# Patient Record
Sex: MALE | URBAN - METROPOLITAN AREA
[De-identification: names, ages, dates, MRNs, and addresses within clinical notes are randomized per-mention and may not be internally consistent; named-entity substitution may affect disease eponyms.]

---

## 2018-12-10 ENCOUNTER — IMPORTED ENCOUNTER (OUTPATIENT)
Dept: URBAN - METROPOLITAN AREA CLINIC 9 | Facility: CLINIC | Age: 68
End: 2018-12-10

## 2019-05-01 NOTE — PATIENT DISCUSSION
Patient Instructions  1. No med changes  2. 6 min walk test and full set of PFTs next visit     You are scheduled for a bronchoscopy on 7/12 at 8 AM at the [facility] on 1st floor. Arrive 1 hour early.     Instructions     1. Nothing to eat or drink 8 hours before procedure.  2. Hold your morning medications. Bring them with you to take after the procedure.  3. Have a  available to take you home.     Next transplant clinic appointment:  2 months with CXR, labs and PFTs and bronchoscopy   Next lab draw: 1 month      We will plan to schedule your bronchoscopy on Tuesday 9/12 or Wednesday 9/13 at 8:00am. We were unable to get this on the schedule today, but I will call in early August to arrange. Once I have this scheduled I will contact you to confirm the date and time. If you'd like to follow up with me I can be reached at [phone].    Pt has upcoming social engagements this weekend, so would like to defer procedure today. Will excise on f/u.

## 2019-07-17 NOTE — PROCEDURE NOTE: CLINICAL
PROCEDURE NOTE: Excision of Eyelid Lesion Right Lower Lid. Diagnosis: Eyelid Lesion, Uncertain Behavior. Prior to treatment, the risks/benefits/alternatives were discussed. The patient wished to proceed with procedure. Local anesthetic was given. The lesions were incised and removed. Patient tolerated procedure well. There were no complications. Post procedure instructions given. Adi Khoury

## 2019-07-17 NOTE — PATIENT DISCUSSION
Appear as benign hydrocystomas.   Discussed with patient that benign lesions may recur.  May have bruising and swelling for a week or more.  Patient desires to proceed today. Lesion excised; contents discarded. Consent form signed.  Syd evans TId until healed in.  written rx given.  Follow up prn.

## 2021-03-17 ENCOUNTER — IMPORTED ENCOUNTER (OUTPATIENT)
Dept: URBAN - METROPOLITAN AREA CLINIC 9 | Facility: CLINIC | Age: 71
End: 2021-03-17

## 2021-10-16 ASSESSMENT — VISUAL ACUITY
OD_CC: 20/20 SN
OS_PH: 20/20 SN
OS_SC: 20/25 SN
OD_CC: 20/20 SN
OS_CC: 20/20 - SN
OD_SC: 20/200 SN
OD_SC: 20/100 SN
OS_CC: 20/25 SN
OS_SC: 20/30 -2 SN

## 2021-10-16 ASSESSMENT — KERATOMETRY
OD_AXISANGLE_DEGREES: 20
OS_K1POWER_DIOPTERS: 42
OD_K2POWER_DIOPTERS: 42.5
OS_AXISANGLE_DEGREES: 163
OD_AXISANGLE2_DEGREES: 110
OS_K2POWER_DIOPTERS: 42.5
OD_K1POWER_DIOPTERS: 42
OS_AXISANGLE2_DEGREES: 73

## 2021-10-16 ASSESSMENT — TONOMETRY
OD_IOP_MMHG: 12
OD_IOP_MMHG: 14
OS_IOP_MMHG: 13
OS_IOP_MMHG: 14

## 2022-07-07 RX ORDER — LORAZEPAM 0.5 MG/1
TABLET ORAL
COMMUNITY
Start: 2021-12-28

## 2022-07-07 RX ORDER — FLUOXETINE HYDROCHLORIDE 40 MG/1
1 CAPSULE ORAL
COMMUNITY

## 2022-07-07 RX ORDER — LOSARTAN POTASSIUM 100 MG/1
TABLET ORAL
COMMUNITY
End: 2022-08-19

## 2024-02-27 ENCOUNTER — NEW PATIENT (OUTPATIENT)
Dept: URBAN - METROPOLITAN AREA CLINIC 14 | Facility: CLINIC | Age: 74
End: 2024-02-27

## 2024-02-27 DIAGNOSIS — H04.123: ICD-10-CM

## 2024-02-27 DIAGNOSIS — H44.23: ICD-10-CM

## 2024-02-27 PROCEDURE — 92004 COMPRE OPH EXAM NEW PT 1/>: CPT

## 2024-02-27 PROCEDURE — 92015 DETERMINE REFRACTIVE STATE: CPT

## 2024-02-27 ASSESSMENT — VISUAL ACUITY
OS_SC: 20/40+2
OS_PH: 20/30
OD_PH: 20/50-1
OD_BCVA: 20/20
OD_SC: 20/400
OS_BCVA: 20/25-1

## 2024-02-27 ASSESSMENT — TONOMETRY
OD_IOP_MMHG: 13
OS_IOP_MMHG: 14